# Patient Record
Sex: FEMALE | Race: WHITE | HISPANIC OR LATINO | Employment: STUDENT | URBAN - METROPOLITAN AREA
[De-identification: names, ages, dates, MRNs, and addresses within clinical notes are randomized per-mention and may not be internally consistent; named-entity substitution may affect disease eponyms.]

---

## 2019-02-14 ENCOUNTER — HOSPITAL ENCOUNTER (OUTPATIENT)
Facility: OTHER | Age: 29
Discharge: HOME | End: 2019-02-15
Attending: EMERGENCY MEDICINE | Admitting: EMERGENCY MEDICINE
Payer: COMMERCIAL

## 2019-02-14 DIAGNOSIS — K35.80 ACUTE APPENDICITIS, UNSPECIFIED ACUTE APPENDICITIS TYPE: Primary | ICD-10-CM

## 2019-02-14 DIAGNOSIS — R00.0 TACHYCARDIA: ICD-10-CM

## 2019-02-14 LAB
ANION GAP SERPL CALC-SCNC: 12 MMOL/L
B-HCG UR QL: NEGATIVE
BASOPHILS # BLD AUTO: 0.02 K/UL
BASOPHILS NFR BLD: 0.2 %
BILIRUB UR QL STRIP: NEGATIVE
BUN SERPL-MCNC: 10 MG/DL
CALCIUM SERPL-MCNC: 9.6 MG/DL
CHLORIDE SERPL-SCNC: 104 MMOL/L
CLARITY UR: CLEAR
CO2 SERPL-SCNC: 24 MMOL/L
COLOR UR: YELLOW
CREAT SERPL-MCNC: 0.9 MG/DL
CTP QC/QA: YES
DIFFERENTIAL METHOD: ABNORMAL
EOSINOPHIL # BLD AUTO: 0.2 K/UL
EOSINOPHIL NFR BLD: 1.7 %
ERYTHROCYTE [DISTWIDTH] IN BLOOD BY AUTOMATED COUNT: 12.4 %
EST. GFR  (AFRICAN AMERICAN): >60 ML/MIN/1.73 M^2
EST. GFR  (NON AFRICAN AMERICAN): >60 ML/MIN/1.73 M^2
GLUCOSE SERPL-MCNC: 91 MG/DL
GLUCOSE UR QL STRIP: NEGATIVE
HCT VFR BLD AUTO: 41.5 %
HGB BLD-MCNC: 14.6 G/DL
HGB UR QL STRIP: NEGATIVE
KETONES UR QL STRIP: NEGATIVE
LEUKOCYTE ESTERASE UR QL STRIP: NEGATIVE
LYMPHOCYTES # BLD AUTO: 1.4 K/UL
LYMPHOCYTES NFR BLD: 15.4 %
MCH RBC QN AUTO: 31.3 PG
MCHC RBC AUTO-ENTMCNC: 35.2 G/DL
MCV RBC AUTO: 89 FL
MONOCYTES # BLD AUTO: 0.5 K/UL
MONOCYTES NFR BLD: 5.2 %
NEUTROPHILS # BLD AUTO: 7 K/UL
NEUTROPHILS NFR BLD: 77.3 %
NITRITE UR QL STRIP: NEGATIVE
PH UR STRIP: 7 [PH] (ref 5–8)
PLATELET # BLD AUTO: 224 K/UL
PMV BLD AUTO: 10.9 FL
POTASSIUM SERPL-SCNC: 3.6 MMOL/L
PROT UR QL STRIP: NEGATIVE
RBC # BLD AUTO: 4.67 M/UL
SODIUM SERPL-SCNC: 140 MMOL/L
SP GR UR STRIP: <=1.005 (ref 1–1.03)
URN SPEC COLLECT METH UR: ABNORMAL
UROBILINOGEN UR STRIP-ACNC: NEGATIVE EU/DL
WBC # BLD AUTO: 9.02 K/UL

## 2019-02-14 PROCEDURE — 25500020 PHARM REV CODE 255: Performed by: EMERGENCY MEDICINE

## 2019-02-14 PROCEDURE — 25000003 PHARM REV CODE 250: Performed by: PHYSICIAN ASSISTANT

## 2019-02-14 PROCEDURE — 93005 ELECTROCARDIOGRAM TRACING: CPT

## 2019-02-14 PROCEDURE — 96365 THER/PROPH/DIAG IV INF INIT: CPT

## 2019-02-14 PROCEDURE — 63600175 PHARM REV CODE 636 W HCPCS: Performed by: PHYSICIAN ASSISTANT

## 2019-02-14 PROCEDURE — 96366 THER/PROPH/DIAG IV INF ADDON: CPT

## 2019-02-14 PROCEDURE — 85025 COMPLETE CBC W/AUTO DIFF WBC: CPT

## 2019-02-14 PROCEDURE — G0378 HOSPITAL OBSERVATION PER HR: HCPCS

## 2019-02-14 PROCEDURE — 93010 ELECTROCARDIOGRAM REPORT: CPT | Mod: ,,, | Performed by: INTERNAL MEDICINE

## 2019-02-14 PROCEDURE — 99285 EMERGENCY DEPT VISIT HI MDM: CPT

## 2019-02-14 PROCEDURE — 93010 EKG 12-LEAD: ICD-10-PCS | Mod: ,,, | Performed by: INTERNAL MEDICINE

## 2019-02-14 PROCEDURE — 96361 HYDRATE IV INFUSION ADD-ON: CPT

## 2019-02-14 PROCEDURE — 96375 TX/PRO/DX INJ NEW DRUG ADDON: CPT

## 2019-02-14 PROCEDURE — 81003 URINALYSIS AUTO W/O SCOPE: CPT

## 2019-02-14 PROCEDURE — 80048 BASIC METABOLIC PNL TOTAL CA: CPT

## 2019-02-14 PROCEDURE — 81025 URINE PREGNANCY TEST: CPT | Performed by: EMERGENCY MEDICINE

## 2019-02-14 RX ORDER — ACETAMINOPHEN 325 MG/1
650 TABLET ORAL EVERY 8 HOURS PRN
Status: DISCONTINUED | OUTPATIENT
Start: 2019-02-14 | End: 2019-02-15 | Stop reason: HOSPADM

## 2019-02-14 RX ORDER — SODIUM CHLORIDE 9 MG/ML
INJECTION, SOLUTION INTRAVENOUS CONTINUOUS
Status: DISCONTINUED | OUTPATIENT
Start: 2019-02-14 | End: 2019-02-15 | Stop reason: HOSPADM

## 2019-02-14 RX ORDER — MORPHINE SULFATE 4 MG/ML
4 INJECTION, SOLUTION INTRAMUSCULAR; INTRAVENOUS EVERY 4 HOURS PRN
Status: DISCONTINUED | OUTPATIENT
Start: 2019-02-14 | End: 2019-02-15 | Stop reason: HOSPADM

## 2019-02-14 RX ORDER — KETOROLAC TROMETHAMINE 30 MG/ML
15 INJECTION, SOLUTION INTRAMUSCULAR; INTRAVENOUS
Status: COMPLETED | OUTPATIENT
Start: 2019-02-14 | End: 2019-02-14

## 2019-02-14 RX ORDER — ONDANSETRON 2 MG/ML
4 INJECTION INTRAMUSCULAR; INTRAVENOUS EVERY 8 HOURS PRN
Status: DISCONTINUED | OUTPATIENT
Start: 2019-02-14 | End: 2019-02-15

## 2019-02-14 RX ORDER — SODIUM CHLORIDE 0.9 % (FLUSH) 0.9 %
5 SYRINGE (ML) INJECTION
Status: DISCONTINUED | OUTPATIENT
Start: 2019-02-14 | End: 2019-02-15 | Stop reason: HOSPADM

## 2019-02-14 RX ORDER — KETOROLAC TROMETHAMINE 30 MG/ML
15 INJECTION, SOLUTION INTRAMUSCULAR; INTRAVENOUS EVERY 6 HOURS PRN
Status: DISCONTINUED | OUTPATIENT
Start: 2019-02-15 | End: 2019-02-15 | Stop reason: HOSPADM

## 2019-02-14 RX ADMIN — SODIUM CHLORIDE 1000 ML: 0.9 INJECTION, SOLUTION INTRAVENOUS at 07:02

## 2019-02-14 RX ADMIN — SODIUM CHLORIDE 1000 ML: 0.9 INJECTION, SOLUTION INTRAVENOUS at 06:02

## 2019-02-14 RX ADMIN — SODIUM CHLORIDE: 0.9 INJECTION, SOLUTION INTRAVENOUS at 11:02

## 2019-02-14 RX ADMIN — PIPERACILLIN AND TAZOBACTAM 4.5 G: 4; .5 INJECTION, POWDER, LYOPHILIZED, FOR SOLUTION INTRAVENOUS; PARENTERAL at 08:02

## 2019-02-14 RX ADMIN — KETOROLAC TROMETHAMINE 15 MG: 30 INJECTION, SOLUTION INTRAMUSCULAR; INTRAVENOUS at 06:02

## 2019-02-14 RX ADMIN — IOHEXOL 75 ML: 350 INJECTION, SOLUTION INTRAVENOUS at 07:02

## 2019-02-14 NOTE — ED TRIAGE NOTES
Pt reports 7 out of 10 RLQ abdominal pain x 1 day, denies fever, denies n/v. Pt sent from [facility] for further evaluation. Pt is AAO x 3, answers questions appropriately

## 2019-02-15 ENCOUNTER — ANESTHESIA EVENT (OUTPATIENT)
Dept: SURGERY | Facility: OTHER | Age: 29
End: 2019-02-15
Payer: COMMERCIAL

## 2019-02-15 ENCOUNTER — ANESTHESIA (OUTPATIENT)
Dept: SURGERY | Facility: OTHER | Age: 29
End: 2019-02-15
Payer: COMMERCIAL

## 2019-02-15 VITALS
SYSTOLIC BLOOD PRESSURE: 111 MMHG | WEIGHT: 117 LBS | DIASTOLIC BLOOD PRESSURE: 59 MMHG | RESPIRATION RATE: 16 BRPM | OXYGEN SATURATION: 98 % | HEART RATE: 76 BPM | HEIGHT: 67 IN | BODY MASS INDEX: 18.36 KG/M2 | TEMPERATURE: 98 F

## 2019-02-15 PROCEDURE — 25000003 PHARM REV CODE 250: Performed by: NURSE ANESTHETIST, CERTIFIED REGISTERED

## 2019-02-15 PROCEDURE — G0378 HOSPITAL OBSERVATION PER HR: HCPCS

## 2019-02-15 PROCEDURE — 63600175 PHARM REV CODE 636 W HCPCS: Performed by: PHYSICIAN ASSISTANT

## 2019-02-15 PROCEDURE — 36000708 HC OR TIME LEV III 1ST 15 MIN: Performed by: SPECIALIST

## 2019-02-15 PROCEDURE — 88304 TISSUE EXAM BY PATHOLOGIST: CPT | Mod: 26,,, | Performed by: PATHOLOGY

## 2019-02-15 PROCEDURE — 63600175 PHARM REV CODE 636 W HCPCS: Performed by: ANESTHESIOLOGY

## 2019-02-15 PROCEDURE — 27201423 OPTIME MED/SURG SUP & DEVICES STERILE SUPPLY: Performed by: SPECIALIST

## 2019-02-15 PROCEDURE — 63600175 PHARM REV CODE 636 W HCPCS: Performed by: NURSE ANESTHETIST, CERTIFIED REGISTERED

## 2019-02-15 PROCEDURE — 36000709 HC OR TIME LEV III EA ADD 15 MIN: Performed by: SPECIALIST

## 2019-02-15 PROCEDURE — 71000033 HC RECOVERY, INTIAL HOUR: Performed by: SPECIALIST

## 2019-02-15 PROCEDURE — 88304 TISSUE SPECIMEN TO PATHOLOGY - SURGERY: ICD-10-PCS | Mod: 26,,, | Performed by: PATHOLOGY

## 2019-02-15 PROCEDURE — 88304 TISSUE EXAM BY PATHOLOGIST: CPT | Performed by: PATHOLOGY

## 2019-02-15 PROCEDURE — 94761 N-INVAS EAR/PLS OXIMETRY MLT: CPT

## 2019-02-15 PROCEDURE — 37000008 HC ANESTHESIA 1ST 15 MINUTES: Performed by: SPECIALIST

## 2019-02-15 PROCEDURE — 25000003 PHARM REV CODE 250: Performed by: PHYSICIAN ASSISTANT

## 2019-02-15 PROCEDURE — 25000003 PHARM REV CODE 250: Performed by: ANESTHESIOLOGY

## 2019-02-15 PROCEDURE — 37000009 HC ANESTHESIA EA ADD 15 MINS: Performed by: SPECIALIST

## 2019-02-15 RX ORDER — OXYCODONE HYDROCHLORIDE 5 MG/1
5 TABLET ORAL
Status: DISCONTINUED | OUTPATIENT
Start: 2019-02-15 | End: 2019-02-15 | Stop reason: SDUPTHER

## 2019-02-15 RX ORDER — SODIUM CHLORIDE 0.9 % (FLUSH) 0.9 %
3 SYRINGE (ML) INJECTION
Status: DISCONTINUED | OUTPATIENT
Start: 2019-02-15 | End: 2019-02-15 | Stop reason: HOSPADM

## 2019-02-15 RX ORDER — SODIUM CHLORIDE, SODIUM LACTATE, POTASSIUM CHLORIDE, CALCIUM CHLORIDE 600; 310; 30; 20 MG/100ML; MG/100ML; MG/100ML; MG/100ML
INJECTION, SOLUTION INTRAVENOUS CONTINUOUS PRN
Status: DISCONTINUED | OUTPATIENT
Start: 2019-02-15 | End: 2019-02-15

## 2019-02-15 RX ORDER — FENTANYL CITRATE 50 UG/ML
25 INJECTION, SOLUTION INTRAMUSCULAR; INTRAVENOUS EVERY 5 MIN PRN
Status: DISCONTINUED | OUTPATIENT
Start: 2019-02-15 | End: 2019-02-15 | Stop reason: HOSPADM

## 2019-02-15 RX ORDER — FENTANYL CITRATE 50 UG/ML
INJECTION, SOLUTION INTRAMUSCULAR; INTRAVENOUS
Status: DISCONTINUED | OUTPATIENT
Start: 2019-02-15 | End: 2019-02-15

## 2019-02-15 RX ORDER — LIDOCAINE HCL/PF 100 MG/5ML
SYRINGE (ML) INTRAVENOUS
Status: DISCONTINUED | OUTPATIENT
Start: 2019-02-15 | End: 2019-02-15

## 2019-02-15 RX ORDER — HYDROMORPHONE HYDROCHLORIDE 2 MG/ML
0.4 INJECTION, SOLUTION INTRAMUSCULAR; INTRAVENOUS; SUBCUTANEOUS EVERY 5 MIN PRN
Status: DISCONTINUED | OUTPATIENT
Start: 2019-02-15 | End: 2019-02-15 | Stop reason: HOSPADM

## 2019-02-15 RX ORDER — ONDANSETRON 2 MG/ML
4 INJECTION INTRAMUSCULAR; INTRAVENOUS DAILY PRN
Status: DISCONTINUED | OUTPATIENT
Start: 2019-02-15 | End: 2019-02-15 | Stop reason: SDUPTHER

## 2019-02-15 RX ORDER — OXYCODONE HYDROCHLORIDE 5 MG/1
5 TABLET ORAL
Status: DISCONTINUED | OUTPATIENT
Start: 2019-02-15 | End: 2019-02-15 | Stop reason: HOSPADM

## 2019-02-15 RX ORDER — HYDROCODONE BITARTRATE AND ACETAMINOPHEN 5; 325 MG/1; MG/1
1 TABLET ORAL EVERY 4 HOURS PRN
Qty: 20 TABLET | Refills: 0 | Status: SHIPPED | OUTPATIENT
Start: 2019-02-15 | End: 2020-11-19

## 2019-02-15 RX ORDER — ONDANSETRON 2 MG/ML
4 INJECTION INTRAMUSCULAR; INTRAVENOUS DAILY PRN
Status: DISCONTINUED | OUTPATIENT
Start: 2019-02-15 | End: 2019-02-15 | Stop reason: HOSPADM

## 2019-02-15 RX ORDER — MIDAZOLAM HYDROCHLORIDE 1 MG/ML
INJECTION INTRAMUSCULAR; INTRAVENOUS
Status: DISCONTINUED | OUTPATIENT
Start: 2019-02-15 | End: 2019-02-15

## 2019-02-15 RX ORDER — NEOSTIGMINE METHYLSULFATE 1 MG/ML
INJECTION, SOLUTION INTRAVENOUS
Status: DISCONTINUED | OUTPATIENT
Start: 2019-02-15 | End: 2019-02-15

## 2019-02-15 RX ORDER — PROPOFOL 10 MG/ML
VIAL (ML) INTRAVENOUS
Status: DISCONTINUED | OUTPATIENT
Start: 2019-02-15 | End: 2019-02-15

## 2019-02-15 RX ORDER — ROCURONIUM BROMIDE 10 MG/ML
INJECTION, SOLUTION INTRAVENOUS
Status: DISCONTINUED | OUTPATIENT
Start: 2019-02-15 | End: 2019-02-15

## 2019-02-15 RX ORDER — MEPERIDINE HYDROCHLORIDE 25 MG/ML
12.5 INJECTION INTRAMUSCULAR; INTRAVENOUS; SUBCUTANEOUS ONCE AS NEEDED
Status: DISCONTINUED | OUTPATIENT
Start: 2019-02-15 | End: 2019-02-15 | Stop reason: HOSPADM

## 2019-02-15 RX ORDER — HYDROMORPHONE HYDROCHLORIDE 2 MG/ML
0.4 INJECTION, SOLUTION INTRAMUSCULAR; INTRAVENOUS; SUBCUTANEOUS EVERY 5 MIN PRN
Status: DISCONTINUED | OUTPATIENT
Start: 2019-02-15 | End: 2019-02-15 | Stop reason: SDUPTHER

## 2019-02-15 RX ORDER — HYDROCODONE BITARTRATE AND ACETAMINOPHEN 10; 325 MG/1; MG/1
1 TABLET ORAL EVERY 4 HOURS PRN
Status: DISCONTINUED | OUTPATIENT
Start: 2019-02-15 | End: 2019-02-15 | Stop reason: HOSPADM

## 2019-02-15 RX ORDER — HYDROCODONE BITARTRATE AND ACETAMINOPHEN 5; 325 MG/1; MG/1
1 TABLET ORAL EVERY 4 HOURS PRN
Status: DISCONTINUED | OUTPATIENT
Start: 2019-02-15 | End: 2019-02-15 | Stop reason: HOSPADM

## 2019-02-15 RX ORDER — ONDANSETRON 2 MG/ML
4 INJECTION INTRAMUSCULAR; INTRAVENOUS EVERY 12 HOURS PRN
Status: DISCONTINUED | OUTPATIENT
Start: 2019-02-15 | End: 2019-02-15 | Stop reason: HOSPADM

## 2019-02-15 RX ORDER — GLYCOPYRROLATE 0.2 MG/ML
INJECTION INTRAMUSCULAR; INTRAVENOUS
Status: DISCONTINUED | OUTPATIENT
Start: 2019-02-15 | End: 2019-02-15

## 2019-02-15 RX ORDER — MEPERIDINE HYDROCHLORIDE 25 MG/ML
12.5 INJECTION INTRAMUSCULAR; INTRAVENOUS; SUBCUTANEOUS ONCE AS NEEDED
Status: DISCONTINUED | OUTPATIENT
Start: 2019-02-15 | End: 2019-02-15 | Stop reason: SDUPTHER

## 2019-02-15 RX ORDER — DEXAMETHASONE SODIUM PHOSPHATE 4 MG/ML
INJECTION, SOLUTION INTRA-ARTICULAR; INTRALESIONAL; INTRAMUSCULAR; INTRAVENOUS; SOFT TISSUE
Status: DISCONTINUED | OUTPATIENT
Start: 2019-02-15 | End: 2019-02-15

## 2019-02-15 RX ORDER — SUCCINYLCHOLINE CHLORIDE 20 MG/ML
INJECTION INTRAMUSCULAR; INTRAVENOUS
Status: DISCONTINUED | OUTPATIENT
Start: 2019-02-15 | End: 2019-02-15

## 2019-02-15 RX ORDER — FENTANYL CITRATE 50 UG/ML
25 INJECTION, SOLUTION INTRAMUSCULAR; INTRAVENOUS EVERY 5 MIN PRN
Status: COMPLETED | OUTPATIENT
Start: 2019-02-15 | End: 2019-02-15

## 2019-02-15 RX ORDER — SODIUM CHLORIDE 9 MG/ML
INJECTION, SOLUTION INTRAVENOUS CONTINUOUS
Status: DISCONTINUED | OUTPATIENT
Start: 2019-02-15 | End: 2019-02-15 | Stop reason: HOSPADM

## 2019-02-15 RX ORDER — HYDROCODONE BITARTRATE AND ACETAMINOPHEN 5; 325 MG/1; MG/1
TABLET ORAL
Qty: 20 TABLET | Refills: 0 | Status: SHIPPED | OUTPATIENT
Start: 2019-02-15 | End: 2020-11-19

## 2019-02-15 RX ADMIN — PROPOFOL 180 MG: 10 INJECTION, EMULSION INTRAVENOUS at 08:02

## 2019-02-15 RX ADMIN — GLYCOPYRROLATE 0.8 MG: 0.2 INJECTION, SOLUTION INTRAMUSCULAR; INTRAVENOUS at 09:02

## 2019-02-15 RX ADMIN — MIDAZOLAM HYDROCHLORIDE 2 MG: 1 INJECTION, SOLUTION INTRAMUSCULAR; INTRAVENOUS at 08:02

## 2019-02-15 RX ADMIN — FENTANYL CITRATE 25 MCG: 50 INJECTION, SOLUTION INTRAMUSCULAR; INTRAVENOUS at 09:02

## 2019-02-15 RX ADMIN — KETOROLAC TROMETHAMINE 15 MG: 30 INJECTION, SOLUTION INTRAMUSCULAR at 01:02

## 2019-02-15 RX ADMIN — FENTANYL CITRATE 25 MCG: 50 INJECTION, SOLUTION INTRAMUSCULAR; INTRAVENOUS at 10:02

## 2019-02-15 RX ADMIN — ROCURONIUM BROMIDE 5 MG: 10 INJECTION, SOLUTION INTRAVENOUS at 08:02

## 2019-02-15 RX ADMIN — OXYCODONE HYDROCHLORIDE 5 MG: 5 TABLET ORAL at 09:02

## 2019-02-15 RX ADMIN — NEOSTIGMINE METHYLSULFATE 5 MG: 1 INJECTION INTRAVENOUS at 09:02

## 2019-02-15 RX ADMIN — KETOROLAC TROMETHAMINE 15 MG: 30 INJECTION, SOLUTION INTRAMUSCULAR at 05:02

## 2019-02-15 RX ADMIN — FENTANYL CITRATE 100 MCG: 50 INJECTION, SOLUTION INTRAMUSCULAR; INTRAVENOUS at 09:02

## 2019-02-15 RX ADMIN — LIDOCAINE HYDROCHLORIDE 50 MG: 20 INJECTION, SOLUTION INTRAVENOUS at 08:02

## 2019-02-15 RX ADMIN — FENTANYL CITRATE 100 MCG: 50 INJECTION, SOLUTION INTRAMUSCULAR; INTRAVENOUS at 08:02

## 2019-02-15 RX ADMIN — PIPERACILLIN AND TAZOBACTAM 4.5 G: 4; .5 INJECTION, POWDER, LYOPHILIZED, FOR SOLUTION INTRAVENOUS; PARENTERAL at 02:02

## 2019-02-15 RX ADMIN — ONDANSETRON 4 MG: 2 INJECTION INTRAMUSCULAR; INTRAVENOUS at 09:02

## 2019-02-15 RX ADMIN — CARBOXYMETHYLCELLULOSE SODIUM 2 DROP: 2.5 SOLUTION/ DROPS OPHTHALMIC at 08:02

## 2019-02-15 RX ADMIN — SUCCINYLCHOLINE CHLORIDE 120 MG: 20 INJECTION, SOLUTION INTRAMUSCULAR; INTRAVENOUS at 08:02

## 2019-02-15 RX ADMIN — SODIUM CHLORIDE, SODIUM LACTATE, POTASSIUM CHLORIDE, AND CALCIUM CHLORIDE: 600; 310; 30; 20 INJECTION, SOLUTION INTRAVENOUS at 08:02

## 2019-02-15 RX ADMIN — PIPERACILLIN AND TAZOBACTAM 4.5 G: 4; .5 INJECTION, POWDER, LYOPHILIZED, FOR SOLUTION INTRAVENOUS; PARENTERAL at 05:02

## 2019-02-15 RX ADMIN — DEXAMETHASONE SODIUM PHOSPHATE 8 MG: 4 INJECTION, SOLUTION INTRAMUSCULAR; INTRAVENOUS at 09:02

## 2019-02-15 NOTE — TRANSFER OF CARE
"Anesthesia Transfer of Care Note    Patient: Cortney Cisneros    Procedure(s) Performed: Procedure(s) (LRB):  APPENDECTOMY, LAPAROSCOPIC (N/A)    Patient location: PACU    Anesthesia Type: general    Transport from OR: Transported from OR on 2-3 L/min O2 by NC with adequate spontaneous ventilation    Post pain: adequate analgesia    Post assessment: no apparent anesthetic complications    Post vital signs: stable    Level of consciousness: awake, alert and oriented    Nausea/Vomiting: no nausea/vomiting    Complications: none    Transfer of care protocol was followed      Last vitals:   Visit Vitals  /66 (BP Location: Left arm, Patient Position: Lying)   Pulse 84   Temp 36.6 °C (97.8 °F) (Oral)   Resp 18   Ht 5' 7" (1.702 m)   Wt 53.1 kg (117 lb 0 oz)   LMP 02/09/2019   SpO2 97%   Breastfeeding? No   BMI 18.32 kg/m²     "

## 2019-02-15 NOTE — ED PROVIDER NOTES
Encounter Date: 2/14/2019       History     Chief Complaint   Patient presents with    Abdominal Pain     lower abd pain x 1 days with localized pain to RLQ, sent to ED from [facility] for rule-out appendicitis     Patient is a 28-year-old female with history of prolactinoma who presents to the ED with abdominal pain. Patient reports gradual onset of right lower quadrant/pelvis pain for the past 24 hr.  She states the pain is worse at movement.  She states the pain is a 0/10 at rest but a 7/10 with movement.  She states the pain was originally located the to the right flank/lower back region.  She denies fever, nausea, emesis, or appetite change.  She reports normal bowel movements.  She denies vaginal discharge, dysuria or hematuria.  She has not taken any analgesics for the pain. She states she was evaluated at [facility] and was advised to come to the ED for further evaluation of suspected appendicitis.      The history is provided by the patient.     Review of patient's allergies indicates:  No Known Allergies  Past Medical History:   Diagnosis Date    Prolactinoma      History reviewed. No pertinent surgical history.  History reviewed. No pertinent family history.  Social History     Tobacco Use    Smoking status: Never Smoker   Substance Use Topics    Alcohol use: Yes    Drug use: Not on file     Review of Systems   Constitutional: Negative for chills and fever.   HENT: Negative for congestion and sore throat.    Eyes: Negative for pain.   Respiratory: Negative for shortness of breath.    Cardiovascular: Negative for chest pain.   Gastrointestinal: Positive for abdominal pain. Negative for diarrhea, nausea and vomiting.   Genitourinary: Negative for dysuria.   Musculoskeletal: Negative for arthralgias.   Skin: Negative for rash.   Neurological: Negative for headaches.       Physical Exam     Initial Vitals [02/14/19 1721]   BP Pulse Resp Temp SpO2   126/86 103 16 98 °F (36.7 °C) 98 %       MAP       --         Physical Exam    Constitutional: She is cooperative. No distress.   HENT:   Head: Normocephalic and atraumatic.   Eyes: EOM are normal. Pupils are equal, round, and reactive to light.   Neck: Normal range of motion. Neck supple.   Cardiovascular: Regular rhythm and intact distal pulses.   Mild tachycardia   Pulmonary/Chest: Breath sounds normal. She has no wheezes. She has no rhonchi. She has no rales.   Abdominal: Soft. Bowel sounds are normal. There is tenderness (Mild right lower quadrant).   Musculoskeletal: Normal range of motion. She exhibits no edema.   Neurological: She is alert and oriented to person, place, and time. GCS eye subscore is 4. GCS verbal subscore is 5. GCS motor subscore is 6.   Skin: Skin is warm and dry. No rash noted.         ED Course   Procedures  Labs Reviewed   URINALYSIS, REFLEX TO URINE CULTURE - Abnormal; Notable for the following components:       Result Value    Specific Gravity, UA <=1.005 (*)     All other components within normal limits    Narrative:     Preferred Collection Type->Urine, Clean Catch   CBC W/ AUTO DIFFERENTIAL - Abnormal; Notable for the following components:    MCH 31.3 (*)     Gran% 77.3 (*)     Lymph% 15.4 (*)     All other components within normal limits   BASIC METABOLIC PANEL   POCT URINE PREGNANCY     EKG Readings: (Independently Interpreted)   Sinus tachycardia at a rate of 180 beats per minute. No STEMI.  No ischemic changes.  No previous EKG to compare to.       Imaging Results          CT Abdomen Pelvis With Contrast (Final result)  Result time 02/14/19 19:19:33    Final result by Rosa Gregorio MD (02/14/19 19:19:33)                 Impression:      1. Appendix is mildly dilated with mild wall thickening which may indicate early acute appendicitis.  No surrounding inflammatory changes seen.  No evidence of abscess or perforation.  Surgical consultation is recommended.  2. Small right ovarian cyst or dominant follicle  measuring 2.2 cm which is within normal limits in size for patient of this age.      Electronically signed by: Rosa Gregorio MD  Date:    02/14/2019  Time:    19:19             Narrative:    EXAMINATION:  CT ABDOMEN PELVIS WITH CONTRAST    CLINICAL HISTORY:  RLQ pain, appendicitis suspected;    TECHNIQUE:  Low dose axial images, sagittal and coronal reformations were obtained from the lung bases to the pubic symphysis following the IV administration of 75 mL of Omnipaque 350 .  Oral contrast was not given.    COMPARISON:  None.    FINDINGS:  The visualized portion of the heart is unremarkable.  The lung bases are clear.    No significant hepatic abnormalities are identified.  There is no intra-or extrahepatic biliary ductal dilatation.  The gallbladder is unremarkable.  The stomach, pancreas, spleen, and adrenal glands are unremarkable.    Kidneys enhance normally.  Ureters are difficult to track.  Urinary bladder and uterus show no significant abnormalities.  Right adnexal 2.2 cm hypodense lesion is seen which may represent right ovarian cyst or dominant follicle which is within normal limits in size for patient of this age.  Suspected contraceptive device is seen within the vaginal/cervical region.    Appendix is dilated measuring upwards of 10 mm with mild wall thickening which may indicate early appendicitis.  No surrounding inflammatory changes are appreciated.  The visualized loops of small and large bowel show no evidence of obstruction or inflammation.  No free air or free fluid.    Aorta tapers normally.    No acute osseous abnormality identified. Subcutaneous soft tissue structures are unremarkable.                                 Medical Decision Making:   Initial Assessment:   Urgent evaluation of a 28 y.o. female presenting to the emergency department complaining of right lower quadrant abdominal pain that is worse with movement. Patient is afebrile, nontoxic appearing and hemodynamically stable. Mild  tachycardia noted. Patient has mild, right lower quadrant/pelvic pain without peritoneal signs.  Differential includes appendicitis, ovarian cyst, colitis pain or pyelonephritis.  Will obtain lab work and CT abdominal pelvis for further evaluation.  ED Management:  CBC reveals no leukocytosis.  Chemistry is unremarkable. Urinalysis reveals no UTI.  CT abdomen pelvis reveals signs of acute, early appendicitis.  The appendix is mildly dilated at 10 mm with mild wall thickening.  There are no surrounding inflammatory changes.  There is also an incidental finding of a right adnexal follicle which is within normal limits for patient this size.  Upon reassessment, patient states she is feeling better.  She remains without peritoneal signs.  Patient's tachycardia is persistent, she states she has been told she has a history of this but does not know why.  Possibly secondary to anxiety, as when I am talking to the patient her heart rate increases approximately 10 beats per minute.  EKG obtained reveals sinus tachycardia.  Will provide patient with additional IV fluids  Case was discussed with Dr. Disla- who is amenable to admitting patient for observation and repeat abdominal exams.  Zosyn will be initiated.  Patient is stable for the floor at this time.  Other:   I have discussed this case with another health care provider.                      Clinical Impression:     1. Acute appendicitis, unspecified acute appendicitis type    2. Tachycardia                               Reji Koehler PA-C  02/14/19 2022

## 2019-02-15 NOTE — ED NOTES
ROUNDING:  Lying on the stretcher with HOB at semi-fowlers. AAOx4. Calm and cooperative. Pt is smiling. States RLQ abdominal pain 0/10. Denies n/v, dizziness, lightheadedness or any other discomfort at this time. Skin is warm and dry. Resp:18 even and unlabored. Comfort and BR needs addressed. Plan of care updated. NADN. Bed locked in low position, side rails up x2 and call light within reach. Will continue to monitor.

## 2019-02-15 NOTE — PLAN OF CARE
CM met with pt for initial discharge planning assessment.    Pt had lap appy this am and states she is planning on discharging home later today.    Pt is [employer] student and will follow-up at the [facility] if need be.    Pt is independent and states she will have no CM needs for discharge.     02/15/19 1249   Discharge Assessment   Assessment Type Discharge Planning Assessment   Confirmed/corrected address and phone number on facesheet? Yes   Assessment information obtained from? Patient;Caregiver;Medical Record   Expected Length of Stay (days) 2   Communicated expected length of stay with patient/caregiver yes   Prior to hospitilization cognitive status: Alert/Oriented   Prior to hospitalization functional status: Independent   Current cognitive status: Alert/Oriented   Current Functional Status: Independent   Lives With other (see comments)  (room mate)   Able to Return to Prior Arrangements yes   Is patient able to care for self after discharge? Yes   Patient's perception of discharge disposition home or selfcare   Readmission Within the Last 30 Days no previous admission in last 30 days   Patient currently being followed by outpatient case management? No   Patient currently receives any other outside agency services? No   Equipment Currently Used at Home none   Do you have any problems affording any of your prescribed medications? No   Is the patient taking medications as prescribed? yes   Does the patient have transportation home? Yes   Does the patient receive services at the [facility]? No   Discharge Plan A Home   DME Needed Upon Discharge  none   Patient/Family in Agreement with Plan yes

## 2019-02-15 NOTE — ANESTHESIA PREPROCEDURE EVALUATION
02/15/2019  Cortney Cisneros is a 28 y.o., female.    Anesthesia Evaluation    I have reviewed the Patient Summary Reports.    I have reviewed the Nursing Notes.   I have reviewed the Medications.     Review of Systems  Anesthesia Hx:  No problems with previous Anesthesia  Denies Family Hx of Anesthesia complications.   Denies Personal Hx of Anesthesia complications.   Social:  Non-Smoker    Hematology/Oncology:  Hematology Normal   Oncology Normal     EENT/Dental:EENT/Dental Normal   Cardiovascular:  Cardiovascular Normal     Pulmonary:  Pulmonary Normal    Renal/:  Renal/ Normal     Hepatic/GI:  Hepatic/GI Normal    Musculoskeletal:  Musculoskeletal Normal    Neurological:  Neurology Normal    Endocrine:   Pituitary prolactinoma, followed by MRI every year. On Dostinex ,a Dopamine stimulator inhibits prolactin secretion   Dermatological:  Skin Normal    Psych:  Psychiatric Normal           Physical Exam  General:  Well nourished    Airway/Jaw/Neck:  Airway Findings: Mouth Opening: Normal Mallampati: II      Dental:  Dental Findings: In tact        Mental Status:  Mental Status Findings:  Cooperative, Alert and Oriented         Anesthesia Plan  Type of Anesthesia, risks & benefits discussed:  Anesthesia Type:  general  Patient's Preference:   Intra-op Monitoring Plan: standard ASA monitors  Intra-op Monitoring Plan Comments:   Post Op Pain Control Plan: multimodal analgesia  Post Op Pain Control Plan Comments:   Induction:   IV  Beta Blocker:         Informed Consent: Patient understands risks and agrees with Anesthesia plan.  Questions answered. Anesthesia consent signed with patient.  ASA Score: 2  emergent   Day of Surgery Review of History & Physical:    H&P update referred to the surgeon.         Ready For Surgery From Anesthesia Perspective.

## 2019-02-15 NOTE — ED NOTES
Rounding on the patient has been done. she has been updated on the plan of care and her current status. Pain was assessed and is currently a 1/10 RLQ abdominal pain. Denies n/v, dizziness, lightheadedness or any other discomfort at this time. Comfort positioning and restroom needs were addressed. Necessary items were placed with in her reach and she was advised when a reassessment would take place. The call bell remains at the bedside for any additional patient needs. The patient is resting comfortably on the stretcher, respirations are even and unlabored, skin warm and dry. Warm blanket provided. Will continue to monitor.

## 2019-02-15 NOTE — OR NURSING
Pt without change from previous assessment. Prepared for transfer to inpt room 330. States pain tolerable.

## 2019-02-15 NOTE — OP NOTE
DATE OF PROCEDURE:  02/15/2019.    PREOPERATIVE DIAGNOSIS:  Acute appendicitis.    POSTOPERATIVE DIAGNOSIS:  Acute appendicitis.    PROCEDURE:  Laparoscopic appendectomy.    PROCEDURE IN DETAIL:  The patient was taken to the Operating Room, placed on the   table in the supine position.  After smooth induction with general anesthesia,   the abdomen was prepped and draped in the usual sterile fashion.  A subumbilical   incision was made and the Veress needle placed.  The abdomen was easily   insufflated with CO2 to 12 mmHg.  A 5-mm Optiview was advanced and the scope   placed.  Inspection revealed no injury to the underlying bowel.  A 5-mm cannula   was placed in the right abdomen and a 12-mm Optiview in the left lower quadrant.    Manipulation in the right lower quadrant revealed an acute appendicitis   without evidence of abscess or perforation.  The base of the appendix was freed   up.  A window was made through the mesoappendix.  The base of the appendix was   then divided using the Endo-ANGELITA stapler.  The mesoappendix was then taken down   using the Harmonic scalpel.  The appendix was placed in an Endobag and removed   through the 12 mm port site.  The area was inspected and there was no evidence   of bleeding.  The area was irrigated and the fluid aspirated.  The 12 mm cannula   was then removed.  Under direct vision, the fascia was reapproximated using 2-0   Vicryl.  The 5-mm cannulas were then removed.  The CO2 was allowed to escape.    The wounds were closed using 4-0 Vicryl followed by glue and Steri-Strips.    Blood loss was minimal.  The patient tolerated the procedure and left the   Operating Room in stable condition.      SHELTON/LUKE  dd: 02/15/2019 09:42:33 (CST)  td: 02/15/2019 10:20:44 (CST)  Doc ID   #[id number]  Job ID #[id number]    CC:

## 2019-02-15 NOTE — NURSING
Report given to Surgery RN. Pt being transported to surgical street at this time. All belongings sent with pt boyfriend.

## 2019-02-15 NOTE — ANESTHESIA POSTPROCEDURE EVALUATION
"Anesthesia Post Evaluation    Patient: Cortney Cisneros    Procedure(s) Performed: Procedure(s) (LRB):  APPENDECTOMY, LAPAROSCOPIC (N/A)    Final Anesthesia Type: general  Patient location during evaluation: PACU  Patient participation: Yes- Able to Participate  Level of consciousness: awake and alert  Post-procedure vital signs: reviewed and stable  Pain management: adequate  Airway patency: patent  PONV status at discharge: No PONV  Anesthetic complications: no      Cardiovascular status: blood pressure returned to baseline  Respiratory status: unassisted and room air  Hydration status: euvolemic  Follow-up not needed.        Visit Vitals  /80 (BP Location: Right arm, Patient Position: Lying)   Pulse (!) 116   Temp 36.6 °C (97.9 °F) (Oral)   Resp 16   Ht 5' 7" (1.702 m)   Wt 53.1 kg (117 lb 0 oz)   LMP 02/09/2019   SpO2 99%   Breastfeeding? No   BMI 18.32 kg/m²       Pain/Michael Score: Pain Rating Prior to Med Admin: 5 (2/15/2019  9:56 AM)        "

## 2019-02-15 NOTE — BRIEF OP NOTE
[facility]  Brief Operative Note     SUMMARY     Surgery Date: 2/15/2019     Surgeon(s) and Role:     * Carlo Disla MD - Primary    Assisting Surgeon: None    Pre-op Diagnosis:  Appendicitis [K37]    Post-op Diagnosis:  Post-Op Diagnosis Codes:     * Appendicitis [K37]    Procedure(s) (LRB):  APPENDECTOMY, LAPAROSCOPIC (N/A)    Anesthesia: General    Description of the findings of the procedure: Acute appy    Findings/Key Components:     Estimated Blood Loss: * No values recorded between 2/15/2019  9:01 AM and 2/15/2019  9:39 AM *min         Specimens:   Specimen (12h ago, onward)    Start     Ordered    02/15/19 0911  Specimen to Pathology - Surgery  Once     Comments:  1-APPENDIX     Start Status   02/15/19 0911 Collected (02/15/19 0923)       02/15/19 0923          Discharge Note    SUMMARY     Admit Date: 2/14/2019    Discharge Date and Time:  02/15/2019 9:39 AM    Hospital Course (synopsis of major diagnoses, care, treatment, and services provided during the course of the hospital stay): treated     Final Diagnosis: Post-Op Diagnosis Codes:     * Appendicitis [K37]    Disposition: Home or Self Care    Follow Up/Patient Instructions:     Medications:  Reconciled Home Medications:      Medication List      START taking these medications    HYDROcodone-acetaminophen 5-325 mg per tablet  Commonly known as:  NORCO  Take 1 or 2 tabs every 4 hours as needed.          Discharge Procedure Orders   Diet general     Call MD for:  redness, tenderness, or signs of infection (pain, swelling, redness, odor or green/yellow discharge around incision site)     Remove dressing in 48 hours     Shower on day dressing removed (No bath)   Order Comments: You may shower on the 2nd day following your surgery.     Activity as tolerated     Follow-up Information     Carlo Disla MD In 2 weeks.    Specialty:  General Surgery  Contact information:  [address]  [place]  [id number] [phone]

## 2019-02-15 NOTE — NURSING
Pt returned to bed 330 from PACU. Pt is alert and oriented, denies pain at this time. VSS- set to cycle post op. New orders will be received and followed. Boyfriend at bedside. All questions answered, will continue to monitor.

## 2019-02-16 NOTE — NURSING
PT STABLE FOR D/C, VSS. IV REMOVED. D/C INSTRUCTIONS GIVEN TO PATIENT AND VERBALIZED D/C INSTRUCTIONS WITH PATIENT AND FAMILY. PRESCRIPTION GIVEN TO PATIENT. ALL BELONGING SENT HOME WITH PATIENT. ALL QUESTIONS ANSWERED.

## 2020-11-19 ENCOUNTER — OFFICE VISIT (OUTPATIENT)
Dept: ENDOCRINOLOGY | Facility: CLINIC | Age: 30
End: 2020-11-19
Payer: COMMERCIAL

## 2020-11-19 VITALS
BODY MASS INDEX: 18.78 KG/M2 | WEIGHT: 119.63 LBS | SYSTOLIC BLOOD PRESSURE: 124 MMHG | HEART RATE: 97 BPM | RESPIRATION RATE: 16 BRPM | HEIGHT: 67 IN | DIASTOLIC BLOOD PRESSURE: 76 MMHG

## 2020-11-19 DIAGNOSIS — R79.89 ELEVATED PROLACTIN LEVEL: ICD-10-CM

## 2020-11-19 DIAGNOSIS — E23.7 DISORDER OF PITUITARY GLAND, UNSPECIFIED: ICD-10-CM

## 2020-11-19 DIAGNOSIS — E23.7 PITUITARY ABNORMALITY: ICD-10-CM

## 2020-11-19 PROCEDURE — 99204 PR OFFICE/OUTPT VISIT, NEW, LEVL IV, 45-59 MIN: ICD-10-PCS | Mod: S$GLB,,, | Performed by: INTERNAL MEDICINE

## 2020-11-19 PROCEDURE — 99999 PR PBB SHADOW E&M-EST. PATIENT-LVL IV: CPT | Mod: PBBFAC,,, | Performed by: INTERNAL MEDICINE

## 2020-11-19 PROCEDURE — 3008F BODY MASS INDEX DOCD: CPT | Mod: CPTII,S$GLB,, | Performed by: INTERNAL MEDICINE

## 2020-11-19 PROCEDURE — 3008F PR BODY MASS INDEX (BMI) DOCUMENTED: ICD-10-PCS | Mod: CPTII,S$GLB,, | Performed by: INTERNAL MEDICINE

## 2020-11-19 PROCEDURE — 1126F PR PAIN SEVERITY QUANTIFIED, NO PAIN PRESENT: ICD-10-PCS | Mod: S$GLB,,, | Performed by: INTERNAL MEDICINE

## 2020-11-19 PROCEDURE — 99999 PR PBB SHADOW E&M-EST. PATIENT-LVL IV: ICD-10-PCS | Mod: PBBFAC,,, | Performed by: INTERNAL MEDICINE

## 2020-11-19 PROCEDURE — 99204 OFFICE O/P NEW MOD 45 MIN: CPT | Mod: S$GLB,,, | Performed by: INTERNAL MEDICINE

## 2020-11-19 PROCEDURE — 1126F AMNT PAIN NOTED NONE PRSNT: CPT | Mod: S$GLB,,, | Performed by: INTERNAL MEDICINE

## 2020-11-19 RX ORDER — CABERGOLINE 0.5 MG/1
1 TABLET ORAL
COMMUNITY
Start: 2013-05-05 | End: 2020-11-19 | Stop reason: SDUPTHER

## 2020-11-19 RX ORDER — CABERGOLINE 0.5 MG/1
0.5 TABLET ORAL
Qty: 12 TABLET | Refills: 3 | Status: SHIPPED | OUTPATIENT
Start: 2020-11-19 | End: 2021-08-18 | Stop reason: SDUPTHER

## 2020-11-19 NOTE — ASSESSMENT & PLAN NOTE
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  She will try to get me the MRI did if from her last pituitary MRI however she has brought with her today some printed out a few of the images from her pituitary MRI in 2018.  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  I do not see any overt compression of the optic chiasm however the images are limited and the report that she brought mention that the pituitary gland is abutting the optic chiasm so will have her evaluated by Ophthalmology for visual field examination.  Will repeat pituitary MRI for further evaluation of the pituitary.  For now, check pituitary labs (including TFTs and IGF-1) and continue cabergoline 0.5 mg once weekly.

## 2020-11-19 NOTE — PROGRESS NOTES
[facility] ENDOCRINOLOGY INITIAL VISIT  11/19/2020       Subjective:      CC: referred by Claudia Quiroga,* for evaluation and management of elevated prolactin.    HPI:   Cortney Cisneros is a 30 y.o. female with hx of prolactinoma who presents to establish care.  She was referred by [facility] for evaluation as she reportedly has a history of a prolactinoma diagnosed in [place] and followed by an endocrinologist in [place].  She needs to establish care locally.  She is currently taking 0.5 mg tablet cabergoline once weekly.    Initial presentation:   2013 was having acne, regular periods, no galactorrhea, found to have elevated prolactin (patient thinks prolactin was around 30) and MRI showing diffuse enlargement with suprasellar extension abutting the optic chiasm.  That time she reports her other pituitary hormones were normal and she was started cabergoline 0.5 mg once a week she has tolerated without side effects has continued without interruption.  She has not noticed any change since starting medication.      Imaging:      MRI June 30, 2018 pituitary gland diffuse enlargement and suprasellar extension abutting optic chiasm, 1 to normal diameter of 11 mm, gland shows homogeneous enhancement of contrast, normal pituitary stalk.  No significant change since previous MRIs in January 2015, January 2016, and April 2017    Outside labs reviewed:  Labs done in [place]:  January 2020  Prolactin 0.8  TSH 1.7 (0.4-5.0)  Cortisol 0.7 (following low-dose)  ACTH 5  DHEAS 40 ()  Free testosterone 0.24 (0.08- 1.11)   17 hydroxyprogesterone 212  Androstenedione 2.2 (0.3-3.7)    Headache:    Denies    Vision change:   Denies    Formal Visual fields:   Not done     Galactorrhea:    Never had    Menses:    Always had regular menstrual cycles    The patient is not currently using any medication known to cause hyperprolactinemia such as: antipsychotics (risperidone, phenothiazines, haloperidol  and butyrophenones),gastric motility drugs (metoclopramide and domperidone), or antihypertensives (methyldopa, reserpine, and verapamil).      Current symptoms:    Thyroid:  []  fatigue []  weight change []  temp intolerance   []  Denies    []  BM change []  skin/hair change [] palpitations []  tremor [x]  Denies      Growth Hormone Excess:  []  increase hand/foot size []  teeth spacing change    [x]  Denies    []  worse glycemic control []  joint pain     [x]  Denies    DI:   []  polyuria  []  Polydipsia  []  Nocturia    [x]  Denies      Past Medical History:   Diagnosis Date    Acne     Prolactinoma        Past Surgical History:   Procedure Laterality Date    APPENDECTOMY      LAPAROSCOPIC APPENDECTOMY N/A 2/15/2019    Procedure: APPENDECTOMY, LAPAROSCOPIC (ADD ON );  Surgeon: Carlo Disla MD;  Location: [facility];  Service: General;  Laterality: N/A;  (ADD ON )    TONSILLECTOMY         Review of patient's allergies indicates:  No Known Allergies      Current Outpatient Medications:     HYDROcodone-acetaminophen (NORCO) 5-325 mg per tablet, Take 1 or 2 tabs every 4 hours as needed., Disp: 20 tablet, Rfl: 0    HYDROcodone-acetaminophen (NORCO) 5-325 mg per tablet, Take 1 tablet by mouth every 4 (four) hours as needed., Disp: 20 tablet, Rfl: 0    Social History     Socioeconomic History    Marital status: Single     Spouse name: Not on file    Number of children: Not on file    Years of education: Not on file    Highest education level: Not on file   Occupational History    Not on file   Social Needs    Financial resource strain: Not on file    Food insecurity     Worry: Not on file     Inability: Not on file    Transportation needs     Medical: Not on file     Non-medical: Not on file   Tobacco Use    Smoking status: Never Smoker   Substance and Sexual Activity    Alcohol use: Yes    Drug use: Not on file    Sexual activity: Not on file   Lifestyle    Physical activity     Days per week:  "Not on file     Minutes per session: Not on file    Stress: Not on file   Relationships    Social connections     Talks on phone: Not on file     Gets together: Not on file     Attends [employer] service: Not on file     Active member of club or organization: Not on file     Attends meetings of clubs or organizations: Not on file     Relationship status: Not on file   Other Topics Concern    Not on file   Social History Narrative    Not on file       History reviewed. No pertinent family history.    ROS:  14 point review of systems was reviewed.  Pertinent positives and negatives per HPI, all others negative    Objective:   Physical Exam   /76   Pulse 97   Resp 16   Ht 5' 7" (1.702 m)   Wt 54.2 kg (119 lb 9.6 oz)   BMI 18.73 kg/m²   Wt Readings from Last 3 Encounters:   11/19/20 54.2 kg (119 lb 9.6 oz)   02/15/19 53.1 kg (117 lb)   ]    Constitutional:  Pleasant,  in no acute distress.   HENT:   Head:    Normocephalic and atraumatic.   Mouth/Throat:   Oropharynx is clear and moist. No oropharyngeal exudate.   Eyes:    EOMI.  Visual fields intact to confrontation, No scleral icterus.   Neck:    No thyromegaly or palpable thyroid nodules, no cervical LAD  Cardiovascular:  Normal rate, regular rhythm, 2+ radial pulses blx   Respiratory:   Effort normal and breath sounds normal.   Gastrointestinal: Soft, nontender  Neurological:  No tremor, normal speech  Skin:    Skin is warm, dry  Psych:   Normal mood and affect.   Extremity:  No edema or wounds, no deformity     LABORATORY REVIEW:    Chemistry        Component Value Date/Time     02/14/2019 1803    K 3.6 02/14/2019 1803     02/14/2019 1803    CO2 24 02/14/2019 1803    BUN 10 02/14/2019 1803    CREATININE 0.9 02/14/2019 1803    GLU 91 02/14/2019 1803        Component Value Date/Time    CALCIUM 9.6 02/14/2019 1803    ESTGFRAFRICA >60 02/14/2019 1803    EGFRNONAA >60 02/14/2019 1803          Lab Results   Component Value Date     " 02/14/2019    K 3.6 02/14/2019    CALCIUM 9.6 02/14/2019    ESTGFRAFRICA >60 02/14/2019    EGFRNONAA >60 02/14/2019         Assessment/Plan:     Problem List Items Addressed This Visit        1 - High    Elevated prolactin level     Unclear if patient has a macroadenoma that was causing stalk effect with slight rise in prolactin verses prolactinoma as I do not have her initial labs prior to starting cabergoline but her her memory her prolactin was initially only in the 30s.  She will try to get me the MRI did if from her last pituitary MRI however she has brought with her today some printed out a few of the images from her pituitary MRI in 2018.  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  I do not see any overt compression of the optic chiasm however the images are limited and the report that she brought mention that the pituitary gland is abutting the optic chiasm so will have her evaluated by Ophthalmology for visual field examination.  Will repeat pituitary MRI for further evaluation of the pituitary.  For now, check pituitary labs (including TFTs and IGF-1) and continue cabergoline 0.5 mg once weekly.             Pituitary abnormality     Repeat MRI as above.             Other Visit Diagnoses     Disorder of pituitary gland, unspecified        Relevant Orders    Prolactin    Insulin-like growth factor    TSH    Cortisol, 8AM    T4, Free    MRI Pituitary W W/O Contrast    Ambulatory referral/consult to Ophthalmology          Return to clinic in 6 months (can be virtual)  Labs today, needs MRI soon and HVF    Charles Recinos MD

## 2020-11-19 NOTE — LETTER
November 19, 2020      Claudia Quiroga MD  [address] [place] [address]           [facility] 6th Fl  [address]  [facility] [address]  Phone: [phone]  Fax: [phone]          Patient: Cortney Cisneros   MR Number: [id number]   YOB: 1990   Date of Visit: 11/19/2020       Dear Dr. Claudia Quiroga:    Thank you for referring Cortney Cisneros to me for evaluation. Attached you will find relevant portions of my assessment and plan of care.    If you have questions, please do not hesitate to call me. I look forward to following Cortney Cisneros along with you.    Sincerely,    Charles Recinos MD    Enclosure  CC:  No Recipients    If you would like to receive this communication electronically, please contact [email or web address] or [phone] to request more information on [employer] Link access.    For providers and/or their staff who would like to refer a patient to [facility], please contact us through our one-stop-shop provider referral line, [facility], at [phone].    If you feel you have received this communication in error or would no longer like to receive these types of communications, please e-mail [email or web address]

## 2020-11-20 ENCOUNTER — PATIENT MESSAGE (OUTPATIENT)
Dept: ENDOCRINOLOGY | Facility: CLINIC | Age: 30
End: 2020-11-20

## 2020-11-28 ENCOUNTER — PATIENT MESSAGE (OUTPATIENT)
Dept: ENDOCRINOLOGY | Facility: CLINIC | Age: 30
End: 2020-11-28

## 2020-11-28 ENCOUNTER — HOSPITAL ENCOUNTER (OUTPATIENT)
Dept: RADIOLOGY | Facility: HOSPITAL | Age: 30
Discharge: HOME | End: 2020-11-28
Attending: INTERNAL MEDICINE
Payer: COMMERCIAL

## 2020-11-28 DIAGNOSIS — E23.7 DISORDER OF PITUITARY GLAND, UNSPECIFIED: ICD-10-CM

## 2020-11-28 PROCEDURE — 70553 MRI BRAIN STEM W/O & W/DYE: CPT | Mod: TC

## 2020-11-28 PROCEDURE — A9585 GADOBUTROL INJECTION: HCPCS | Performed by: INTERNAL MEDICINE

## 2020-11-28 PROCEDURE — 25500020 PHARM REV CODE 255: Performed by: INTERNAL MEDICINE

## 2020-11-28 PROCEDURE — 70553 MRI PITUITARY W W/O CONTRAST: ICD-10-PCS | Mod: 26,,, | Performed by: RADIOLOGY

## 2020-11-28 PROCEDURE — 70553 MRI BRAIN STEM W/O & W/DYE: CPT | Mod: 26,,, | Performed by: RADIOLOGY

## 2020-11-28 RX ORDER — GADOBUTROL 604.72 MG/ML
6 INJECTION INTRAVENOUS
Status: COMPLETED | OUTPATIENT
Start: 2020-11-28 | End: 2020-11-28

## 2020-11-28 RX ADMIN — GADOBUTROL 6 ML: 604.72 INJECTION INTRAVENOUS at 08:11

## 2021-01-21 ENCOUNTER — CLINICAL SUPPORT (OUTPATIENT)
Dept: OPHTHALMOLOGY | Facility: CLINIC | Age: 31
End: 2021-01-21
Payer: COMMERCIAL

## 2021-01-21 ENCOUNTER — PATIENT MESSAGE (OUTPATIENT)
Dept: ENDOCRINOLOGY | Facility: CLINIC | Age: 31
End: 2021-01-21

## 2021-01-21 ENCOUNTER — OFFICE VISIT (OUTPATIENT)
Dept: OPHTHALMOLOGY | Facility: CLINIC | Age: 31
End: 2021-01-21
Payer: COMMERCIAL

## 2021-01-21 DIAGNOSIS — E23.7 DISORDER OF PITUITARY GLAND, UNSPECIFIED: ICD-10-CM

## 2021-01-21 DIAGNOSIS — E23.7 PITUITARY ABNORMALITY: ICD-10-CM

## 2021-01-21 DIAGNOSIS — R79.89 ELEVATED PROLACTIN LEVEL: ICD-10-CM

## 2021-01-21 DIAGNOSIS — E23.7 PITUITARY ABNORMALITY: Primary | ICD-10-CM

## 2021-01-21 PROCEDURE — 92004 COMPRE OPH EXAM NEW PT 1/>: CPT | Mod: S$GLB,,, | Performed by: OPHTHALMOLOGY

## 2021-01-21 PROCEDURE — 92083 EXTENDED VISUAL FIELD XM: CPT | Mod: S$GLB,,, | Performed by: OPHTHALMOLOGY

## 2021-01-21 PROCEDURE — 99999 PR PBB SHADOW E&M-EST. PATIENT-LVL III: CPT | Mod: PBBFAC,,, | Performed by: OPHTHALMOLOGY

## 2021-01-21 PROCEDURE — 1126F PR PAIN SEVERITY QUANTIFIED, NO PAIN PRESENT: ICD-10-PCS | Mod: S$GLB,,, | Performed by: OPHTHALMOLOGY

## 2021-01-21 PROCEDURE — 92004 PR EYE EXAM, NEW PATIENT,COMPREHESV: ICD-10-PCS | Mod: S$GLB,,, | Performed by: OPHTHALMOLOGY

## 2021-01-21 PROCEDURE — 1126F AMNT PAIN NOTED NONE PRSNT: CPT | Mod: S$GLB,,, | Performed by: OPHTHALMOLOGY

## 2021-01-21 PROCEDURE — 99999 PR PBB SHADOW E&M-EST. PATIENT-LVL III: ICD-10-PCS | Mod: PBBFAC,,, | Performed by: OPHTHALMOLOGY

## 2021-01-21 PROCEDURE — 92083 HUMPHREY VISUAL FIELD - OU - BOTH EYES: ICD-10-PCS | Mod: S$GLB,,, | Performed by: OPHTHALMOLOGY

## 2021-04-28 ENCOUNTER — PATIENT MESSAGE (OUTPATIENT)
Dept: RESEARCH | Facility: HOSPITAL | Age: 31
End: 2021-04-28

## 2021-05-24 ENCOUNTER — OFFICE VISIT (OUTPATIENT)
Dept: ENDOCRINOLOGY | Facility: CLINIC | Age: 31
End: 2021-05-24
Payer: COMMERCIAL

## 2021-05-24 DIAGNOSIS — R79.89 ELEVATED PROLACTIN LEVEL: Primary | ICD-10-CM

## 2021-05-24 DIAGNOSIS — E23.7 PITUITARY ABNORMALITY: ICD-10-CM

## 2021-05-24 PROCEDURE — 99213 OFFICE O/P EST LOW 20 MIN: CPT | Mod: 95,,, | Performed by: INTERNAL MEDICINE

## 2021-05-24 PROCEDURE — 99213 PR OFFICE/OUTPT VISIT, EST, LEVL III, 20-29 MIN: ICD-10-PCS | Mod: 95,,, | Performed by: INTERNAL MEDICINE

## 2021-05-29 ENCOUNTER — LAB VISIT (OUTPATIENT)
Dept: LAB | Facility: HOSPITAL | Age: 31
End: 2021-05-29
Payer: COMMERCIAL

## 2021-05-29 DIAGNOSIS — R79.89 ELEVATED PROLACTIN LEVEL: ICD-10-CM

## 2021-05-29 LAB — PROLACTIN SERPL IA-MCNC: 1.4 NG/ML (ref 5.2–26.5)

## 2021-05-29 PROCEDURE — 84146 ASSAY OF PROLACTIN: CPT | Performed by: INTERNAL MEDICINE

## 2021-05-29 PROCEDURE — 36415 COLL VENOUS BLD VENIPUNCTURE: CPT | Performed by: INTERNAL MEDICINE

## 2021-08-18 RX ORDER — CABERGOLINE 0.5 MG/1
0.5 TABLET ORAL
Qty: 8 TABLET | Refills: 5 | Status: SHIPPED | OUTPATIENT
Start: 2021-08-18 | End: 2022-06-29 | Stop reason: SDUPTHER

## 2022-07-30 ENCOUNTER — LAB VISIT (OUTPATIENT)
Dept: LAB | Facility: HOSPITAL | Age: 32
End: 2022-07-30
Payer: COMMERCIAL

## 2022-07-30 DIAGNOSIS — R79.89 ELEVATED PROLACTIN LEVEL: ICD-10-CM

## 2022-07-30 LAB — PROLACTIN SERPL IA-MCNC: 1 NG/ML (ref 5.2–26.5)

## 2022-07-30 PROCEDURE — 36415 COLL VENOUS BLD VENIPUNCTURE: CPT | Performed by: INTERNAL MEDICINE

## 2022-07-30 PROCEDURE — 84146 ASSAY OF PROLACTIN: CPT | Performed by: INTERNAL MEDICINE

## 2022-08-08 ENCOUNTER — PATIENT MESSAGE (OUTPATIENT)
Dept: ENDOCRINOLOGY | Facility: CLINIC | Age: 32
End: 2022-08-08

## 2022-08-08 ENCOUNTER — OFFICE VISIT (OUTPATIENT)
Dept: ENDOCRINOLOGY | Facility: CLINIC | Age: 32
End: 2022-08-08
Payer: COMMERCIAL

## 2022-08-08 DIAGNOSIS — R79.89 ELEVATED PROLACTIN LEVEL: ICD-10-CM

## 2022-08-08 DIAGNOSIS — E23.7 PITUITARY ABNORMALITY: ICD-10-CM

## 2022-08-08 PROCEDURE — 99214 OFFICE O/P EST MOD 30 MIN: CPT | Mod: 95,,, | Performed by: INTERNAL MEDICINE

## 2022-08-08 PROCEDURE — 1160F PR REVIEW ALL MEDS BY PRESCRIBER/CLIN PHARMACIST DOCUMENTED: ICD-10-PCS | Mod: CPTII,95,, | Performed by: INTERNAL MEDICINE

## 2022-08-08 PROCEDURE — 99214 PR OFFICE/OUTPT VISIT, EST, LEVL IV, 30-39 MIN: ICD-10-PCS | Mod: 95,,, | Performed by: INTERNAL MEDICINE

## 2022-08-08 PROCEDURE — 1159F PR MEDICATION LIST DOCUMENTED IN MEDICAL RECORD: ICD-10-PCS | Mod: CPTII,95,, | Performed by: INTERNAL MEDICINE

## 2022-08-08 PROCEDURE — 1159F MED LIST DOCD IN RCRD: CPT | Mod: CPTII,95,, | Performed by: INTERNAL MEDICINE

## 2022-08-08 PROCEDURE — 1160F RVW MEDS BY RX/DR IN RCRD: CPT | Mod: CPTII,95,, | Performed by: INTERNAL MEDICINE

## 2022-08-08 NOTE — ASSESSMENT & PLAN NOTE
Continue cabergoline 0.5 mg once a week, no side effects.  Will transition care to new endocrine provider after moving out of state.  Can consider holding cabergoline x 1 month next year and monitoring prolactin for rise.

## 2022-08-08 NOTE — ASSESSMENT & PLAN NOTE
Last MRI in 11/2020 w/o discrete adenoma but fulness of pituitary gland with some suprasellar extension but not abutting the optic chiasm.  If prolactin remains normal/low no indication for repeat imaging osito with normal hvf (li visual fields) in 2021.  If cabergoline stopped in the future with rise in cabergoline would recommend monitoring for growth of pituitary with MRI.

## 2022-08-08 NOTE — PROGRESS NOTES
P[facility] ENDOCRINOLOGY FOLLOW UP  08/08/2022     The patient location is: home    Visit type: audiovisual    Face to Face time with patient: 10  15 minutes of total time spent on the encounter, which includes face to face time and non-face to face time preparing to see the patient (eg, review of tests), Obtaining and/or reviewing separately obtained history, Documenting clinical information in the electronic or other health record, Independently interpreting results (not separately reported) and communicating results to the patient/family/caregiver, or Care coordination (not separately reported).     Each patient to whom he or she provides medical services by telemedicine is:  (1) informed of the relationship between the physician and patient and the respective role of any other health care provider with respect to management of the patient; and (2) notified that he or she may decline to receive medical services by telemedicine and may withdraw from such care at any time.    The patient's last visit with me was on 5/24/2021.      Subjective:      CC: f/u prolactinoma on cabergoline    HPI:   Cortney Cisneros is a 32 y.o. female with hx of prolactinoma here for f/u.  She was referred by [facility] for evaluation as she reportedly has a history of a prolactinoma diagnosed in [place] and followed by an endocrinologist in [place].        Interval hx:  At her last visit 1 donaldo ago she had low prolactin and cabergoline was continued with plans to consider trying to withdraw it around 11/2022 (2 years following normal MRI and low prolactin).  May be moving out of state next year.    She has been consistently taking cabergoline 0.5 mg weekly with no missed doses and no reported side effects.    She continues to get regular menstrual cycles, not taking any birth control.    She is feeling well today but some occasional headaches.      Initial presentation:   2013 was having acne, regular periods,  "no galactorrhea, found to have elevated prolactin (patient thinks prolactin was around 30) and MRI showing diffuse enlargement with suprasellar extension abutting the optic chiasm.  That time she reports her other pituitary hormones were normal and she was started cabergoline 0.5 mg once a week she has tolerated without side effects has continued without interruption.  She has not noticed any change since starting medication.      Imaging:   MRI pituitary 11/28/2020  Prominence of the adenohypophysis measuring 0.9 cm in craniocaudal dimension with homogeneous enhancement.  Configuration concerning for possible pituitary hyperplasia which may be physiologic.  No focal hypoenhancement to suggest definite adenoma however not excluded       MRI June 30, 2018 pituitary gland diffuse enlargement and suprasellar extension abutting optic chiasm, 1 to normal diameter of 11 mm, gland shows homogeneous enhancement of contrast, normal pituitary stalk.  No significant change since previous MRIs in January 2015, January 2016, and April 2017    HVF done by Dr. Aguilar 1/21/21, per note:  "no evidence of dysfunction of the optic chiasm or cranial nerves related to the pituitary process. She will return to me as requested"    Lab Results   Component Value Date    PROLACTIN 1.0 (L) 07/30/2022    PROLACTIN 1.4 (L) 05/29/2021    PROLACTIN 0.9 (L) 11/19/2020          Headache:    Occasional HA     Vision change:   Denies    Formal Visual spivey:   1/2021 - Dr. Aguilar, normal HVF, RTC prn     Galactorrhea:    Never had    Menses:    Always had regular menstrual cycles, no plans for pregnancy in near [facility]    The patient is not currently using any medication known to cause hyperprolactinemia such as: antipsychotics (risperidone, phenothiazines, haloperidol and butyrophenones),gastric motility drugs (metoclopramide and domperidone), or antihypertensives (methyldopa, reserpine, and verapamil).    Current symptoms:    Thyroid:  []  " fatigue []  weight change []  temp intolerance   []  Denies    []  BM change []  skin/hair change [] palpitations []  tremor [x]  Denies    Lab Results   Component Value Date    TSH 2.328 11/19/2020    FREET4 1.01 11/19/2020       Growth Hormone Excess:  []  increase hand/foot size []  teeth spacing change    [x]  Denies    []  worse glycemic control []  joint pain     [x]  Denies    Last IGF-1:   Lab Results   Component Value Date    SOMATMDN 223 11/19/2020      DI:   []  polyuria  []  Polydipsia  []  Nocturia    [x]  Denies      Review of patient's allergies indicates:  No Known Allergies      Current Outpatient Medications:     cabergoline (DOSTINEX) 0.5 mg tablet, Take 1 tablet (0.5 mg total) by mouth every 7 days., Disp: 8 tablet, Rfl: 5       ROS:  See hpi    Objective:   Physical Exam   There were no vitals taken for this visit.  Wt Readings from Last 3 Encounters:   11/19/20 54.2 kg (119 lb 9.6 oz)   02/15/19 53.1 kg (117 lb)   ]    Constitutional:  Pleasant,  in no acute distress.   HENT:   Eyes:     No scleral icterus.   Respiratory:   Effort normal   Neurological:  normal speech  Psych:  Normal mood and affect.     LABORATORY REVIEW:    Chemistry        Component Value Date/Time     02/14/2019 1803    K 3.6 02/14/2019 1803     02/14/2019 1803    CO2 24 02/14/2019 1803    BUN 10 02/14/2019 1803    CREATININE 0.9 02/14/2019 1803    GLU 91 02/14/2019 1803        Component Value Date/Time    CALCIUM 9.6 02/14/2019 1803    ESTGFRAFRICA >60 02/14/2019 1803    EGFRNONAA >60 02/14/2019 1803        Outside labs reviewed:  Labs done in [place]:  January 2020  Prolactin 0.8  TSH 1.7 (0.4-5.0)  Cortisol 0.7 (following low-dose)  ACTH 5  DHEAS 40 ()  Free testosterone 0.24 (0.08- 1.11)   17 hydroxyprogesterone 212  Androstenedione 2.2 (0.3-3.7)    Pituitary MRI 11/28/20        Assessment/Plan:     Problem List Items Addressed This Visit        1 - High    Elevated prolactin level     Continue  cabergoline 0.5 mg once a week, no side effects.  Will transition care to new endocrine provider after moving out of state.  Can consider holding cabergoline x 1 month next year and monitoring prolactin for rise.                 Pituitary abnormality     Last MRI in 11/2020 w/o discrete adenoma but fulness of pituitary gland with some suprasellar extension but not abutting the optic chiasm.  If prolactin remains normal/low no indication for repeat imaging osito with normal hvf (li visual fields) in 2021.  If cabergoline stopped in the future with rise in cabergoline would recommend monitoring for growth of pituitary with MRI.                   Return to clinic prn as she is moving    Charles Recinos MD

## (undated) DEVICE — SHEARS HARMONIC 5CM 36CM

## (undated) DEVICE — CANNULA ENDOPATH XCEL 5X100MM

## (undated) DEVICE — NDL INSUF ULTRA VERESS 120MM

## (undated) DEVICE — SUT VICRYL PLUS 4-0 P3 18IN

## (undated) DEVICE — TROCAR ENDOPATH XCEL 12X100MM

## (undated) DEVICE — BAG TISSUE RETRIEVAL 225ML

## (undated) DEVICE — ADHESIVE DERMABOND ADVANCED

## (undated) DEVICE — SUT VICRYL+ 2-0 UR6 27 VIOL

## (undated) DEVICE — SYS CLSR DERMABOND PRINEO 22CM

## (undated) DEVICE — GLOVE BIOGEL SKINSENSE PI 8.0

## (undated) DEVICE — SEE MEDLINE ITEM 156925

## (undated) DEVICE — SOL NS 1000CC

## (undated) DEVICE — CART STAPLE RELD 45MM WHT

## (undated) DEVICE — CART STAPLE FLEX ETX 3.5MM BLU

## (undated) DEVICE — IRRIGATOR ENDOSCOPY DISP.

## (undated) DEVICE — TROCAR ENDOPATH XCEL 5X100MM

## (undated) DEVICE — SOL 9P NACL IRR PIC IL

## (undated) DEVICE — ELECTRODE REM PLYHSV RETURN 9

## (undated) DEVICE — SOL CLEARIFY VISUALIZATION LAP

## (undated) DEVICE — STAPLER INT LINEAR ARTC 3.5-45

## (undated) DEVICE — CLOSURE STERI STRIP .5X4IN TAN

## (undated) DEVICE — GOWN SMART IMP BREATHABLE XXLG